# Patient Record
Sex: MALE | Race: WHITE | NOT HISPANIC OR LATINO | URBAN - METROPOLITAN AREA
[De-identification: names, ages, dates, MRNs, and addresses within clinical notes are randomized per-mention and may not be internally consistent; named-entity substitution may affect disease eponyms.]

---

## 2021-12-27 ENCOUNTER — EMERGENCY (EMERGENCY)
Facility: HOSPITAL | Age: 41
LOS: 1 days | Discharge: HOME | End: 2021-12-27
Admitting: EMERGENCY MEDICINE
Payer: OTHER MISCELLANEOUS

## 2021-12-27 VITALS
HEART RATE: 92 BPM | TEMPERATURE: 99 F | SYSTOLIC BLOOD PRESSURE: 148 MMHG | DIASTOLIC BLOOD PRESSURE: 106 MMHG | OXYGEN SATURATION: 100 % | RESPIRATION RATE: 16 BRPM

## 2021-12-27 PROCEDURE — 73562 X-RAY EXAM OF KNEE 3: CPT | Mod: 26,LT

## 2021-12-27 PROCEDURE — 99283 EMERGENCY DEPT VISIT LOW MDM: CPT

## 2021-12-27 NOTE — ED PROVIDER NOTE - PROGRESS NOTE DETAILS
Pt reassessed at bedside, feels well, pain controlled. Informed of workup in ED, reviewed lab and/or radiology results (when applicable) with patient/caregiver. Informed pt of plan for discharge with instructions to follow up with PMD. Pt/caregiver expressed understanding of plan and agrees with plan for discharge. Strict return precautions discussed with patient in layman's terms, patient demonstrated understanding of return precautions. Patient refusing crutches reports feeling better will weight bear. Hussein Koo PA-C

## 2021-12-27 NOTE — ED PROVIDER NOTE - OBJECTIVE STATEMENT
42 y/o M w/ PSHx 2005 meniscus surgery to L knee presents to the ED w/ L knee pain since an hour ago. Pt states he is a  and was chasing a suspect and fell on the hardwood floor hitting his L knee. Pt states he took 2 Advil's. Denies head trauma, neck pain, fever, chills, dizziness, any other pain 42 y/o M w/ PSHx 2005 meniscus surgery to L knee presents to the ED w/ L knee pain x1 hour ago. Pt  is a  and was chasing a suspect and fell on the hardwood floor hitting his L knee. Pt states he took 2 Advil's prior to ED visit. patient was able to get up on own ambulate and weight bear without difficulty. patient reports some pain with ambulation. patient denies any trauma to head /neck/ back. patient denies LOC AC use. Denies head trauma, neck pain, fever, chills, dizziness, any other pain

## 2021-12-27 NOTE — ED PROVIDER NOTE - PATIENT PORTAL LINK FT
You can access the FollowMyHealth Patient Portal offered by [facility] by registering at the following website: [email or web address]. By joining [facility] [employer]’s FollowMyHealth portal, you will also be able to view your health information using other applications (apps) compatible with our system.

## 2021-12-27 NOTE — ED PROVIDER NOTE - NSFOLLOWUPINSTRUCTIONS_ED_ALL_ED_FT
Rest and elevate your knee.    Apply ice 20 minutes on 2-3 times/day as needed for pain or swelling.   Take Motrin 600mg every 6-8 hrs with food as needed for pain.    Keep the knee immobilizer on during the day for comfort and support.   Use crutches or cane provided for you as needed to assist with ambulation.   Follow up with the Orthopedist doctor within the week for further evaluation- referral above or you can call: Find a Physician helpline ([phone]) for assistance      Any worsening pain, swelling, weakness, numbness, redness, warmth, fever or any NEW CONCERNING symptoms return to the Emergency Dept.

## 2021-12-27 NOTE — ED PROVIDER NOTE - CLINICAL SUMMARY MEDICAL DECISION MAKING FREE TEXT BOX
40 y/o M presents to the ED w/ L knee pain since an hour ago. Will obtain X-ray and reassess. Pt declined pain medication at this time. 40 y/o M presents to the ED w/ L knee pain since an hour ago. Will obtain X-ray, pain management, and reassess. Pt declined pain medication at this time. likely To DC with close Ortho FU.

## 2021-12-27 NOTE — ED PROVIDER NOTE - NSPTACCESSSVCSAPPTDETAILS_ED_ALL_ED_FT
Plz help patient get an appointment with [facility] ortho or [facility] Thank you

## 2022-04-06 ENCOUNTER — OUTPATIENT (OUTPATIENT)
Dept: OUTPATIENT SERVICES | Facility: HOSPITAL | Age: 42
LOS: 1 days | Discharge: HOME | End: 2022-04-06

## 2022-04-06 DIAGNOSIS — Z20.828 CONTACT WITH AND (SUSPECTED) EXPOSURE TO OTHER VIRAL COMMUNICABLE DISEASES: ICD-10-CM

## 2022-04-06 PROBLEM — Z78.9 OTHER SPECIFIED HEALTH STATUS: Chronic | Status: ACTIVE | Noted: 2021-12-27

## 2022-04-06 LAB — SARS-COV-2 RNA SPEC QL NAA+PROBE: SIGNIFICANT CHANGE UP

## 2022-06-26 NOTE — ED PROVIDER NOTE - TOBACCO USE
Plastic Surgery Progress Note (pg [facility]: [id number], [facility]: [phone])    SUBJECTIVE  The patient was seen and examined. No acute events overnight.    OBJECTIVE  ___________________________________________________  VITAL SIGNS / I&O's   Vital Signs Last 24 Hrs  T(C): 36.7 (26 Jun 2022 05:00), Max: 36.8 (25 Jun 2022 10:02)  T(F): 98 (26 Jun 2022 05:00), Max: 98.2 (25 Jun 2022 10:02)  HR: 89 (26 Jun 2022 05:00) (89 - 111)  BP: 110/66 (26 Jun 2022 05:00) (100/62 - 132/78)  BP(mean): --  RR: 17 (26 Jun 2022 05:00) (17 - 18)  SpO2: 99% (26 Jun 2022 05:00) (96% - 100%)      25 Jun 2022 07:01  -  26 Jun 2022 07:00  --------------------------------------------------------  IN:    Jevity 1.2: 720 mL    Lactated Ringers Bolus: 1000 mL    Oral Fluid: 250 mL  Total IN: 1970 mL    OUT:    Bulb (mL): 17.5 mL    Bulb (mL): 17.5 mL  Total OUT: 35 mL    Total NET: 1935 mL        ___________________________________________________  PHYSICAL EXAM      -- CONSTITUTIONAL: NAD  -- HEENT: flap soft and pink with 2-3 second capillary refill. + Cook doppler  -- NECK: flat, LEVY serosang, incision c/d/i  -- RESPIRATORY: nonlabored respirations  -- EXTREMITIES: Lower leg: dressing in place c/d/i, drain with s/s output, soft, SILT    ___________________________________________________  LABS                        12.1   9.94  )-----------( 292      ( 26 Jun 2022 05:53 )             36.2     26 Jun 2022 05:53    140    |  101    |  13     ----------------------------<  140    3.9     |  26     |  0.56     Ca    9.2        26 Jun 2022 05:53  Phos  3.7       26 Jun 2022 05:53  Mg     2.10      26 Jun 2022 05:53        CAPILLARY BLOOD GLUCOSE      POCT Blood Glucose.: 132 mg/dL (26 Jun 2022 05:50)  POCT Blood Glucose.: 114 mg/dL (25 Jun 2022 23:25)  POCT Blood Glucose.: 102 mg/dL (25 Jun 2022 17:39)  POCT Blood Glucose.: 113 mg/dL (25 Jun 2022 12:25)          ___________________________________________________  MICRO  Recent Cultures:    ___________________________________________________  MEDICATIONS  (STANDING):  chlorhexidine 0.12% Liquid 15 milliLiter(s) Swish and Spit two times a day  enoxaparin Injectable 40 milliGRAM(s) SubCutaneous every 24 hours  insulin lispro (ADMELOG) corrective regimen sliding scale   SubCutaneous every 6 hours    MEDICATIONS  (PRN):  acetaminophen    Suspension .. 975 milliGRAM(s) Oral every 6 hours PRN Temp greater or equal to 38C (100.4F), Moderate Pain (4 - 6)  oxyCODONE    Solution 5 milliGRAM(s) Oral every 6 hours PRN Moderate Pain (4 - 6)  oxyCODONE    Solution 10 milliGRAM(s) Oral every 6 hours PRN Severe Pain (7 - 10)   Unknown if ever smoked

## 2024-11-01 NOTE — ED PROVIDER NOTE - CROS ED ROS STATEMENT
all other ROS negative except as per HPI
Pt meets criteria for moderate protein calorie malnutrition in context of acute on chronic illness